# Patient Record
Sex: MALE | URBAN - METROPOLITAN AREA
[De-identification: names, ages, dates, MRNs, and addresses within clinical notes are randomized per-mention and may not be internally consistent; named-entity substitution may affect disease eponyms.]

---

## 2024-11-06 ENCOUNTER — OFFICE VISIT (OUTPATIENT)
Age: 34
End: 2024-11-06

## 2024-11-06 VITALS
HEART RATE: 70 BPM | WEIGHT: 204 LBS | DIASTOLIC BLOOD PRESSURE: 77 MMHG | BODY MASS INDEX: 26.18 KG/M2 | SYSTOLIC BLOOD PRESSURE: 111 MMHG | OXYGEN SATURATION: 98 % | RESPIRATION RATE: 18 BRPM | HEIGHT: 74 IN | TEMPERATURE: 97.9 F

## 2024-11-06 DIAGNOSIS — S20.212A CONTUSION OF RIB ON LEFT SIDE, INITIAL ENCOUNTER: ICD-10-CM

## 2024-11-06 DIAGNOSIS — R07.81 RIB PAIN ON LEFT SIDE: Primary | ICD-10-CM

## 2024-11-06 RX ORDER — IBUPROFEN 100 MG/5ML
SUSPENSION ORAL EVERY 4 HOURS PRN
COMMUNITY

## 2024-11-06 NOTE — PATIENT INSTRUCTIONS
YOU WILL BE CALLED WITH YOUR XRAY RESULTS    Rest and protect the injured or sore area. Stop, change, or take a break from any activity that causes pain.  Put ice or a cold pack on the area for 10 to 20 minutes at a time. Put a thin cloth between the ice and your skin.  After 2 or 3 days, if your swelling is gone, put a heating pad set on low or a warm cloth on your chest. Put a thin cloth between the heating pad and your skin.  You can take an over-the-counter pain medicine, such as acetaminophen (Tylenol), ibuprofen (Advil, Motrin), or naproxen (Aleve). Be safe with medicines. Read and follow all instructions on the label.  As your pain gets better, slowly return to your normal activities. Be patient. Rib bruises can take weeks or months to heal. If the pain gets worse, it may be a sign that you need to rest a while longer.  Follow up with PCP for further management     Seek immediate care in the Emergency Department if you develop any change or worsening of symptoms such as  You have severe trouble breathing.  You have trouble breathing.  You have a fever.  You have a new or worse cough.  You have new or worse pain.
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
